# Patient Record
Sex: FEMALE | Race: WHITE | Employment: UNEMPLOYED | URBAN - NONMETROPOLITAN AREA
[De-identification: names, ages, dates, MRNs, and addresses within clinical notes are randomized per-mention and may not be internally consistent; named-entity substitution may affect disease eponyms.]

---

## 2022-04-13 RX ORDER — BUDESONIDE AND FORMOTEROL FUMARATE DIHYDRATE 80; 4.5 UG/1; UG/1
2 AEROSOL RESPIRATORY (INHALATION) DAILY
COMMUNITY
Start: 2021-07-08

## 2022-04-13 RX ORDER — ALBUTEROL SULFATE 2.5 MG/3ML
2.5 SOLUTION RESPIRATORY (INHALATION) EVERY 4 HOURS PRN
COMMUNITY
Start: 2021-03-30

## 2022-04-13 RX ORDER — BUTALBITAL, ACETAMINOPHEN AND CAFFEINE 50; 325; 40 MG/1; MG/1; MG/1
1 TABLET ORAL EVERY 4 HOURS PRN
COMMUNITY
Start: 2022-04-12

## 2022-04-13 RX ORDER — HYDROXYZINE HYDROCHLORIDE 25 MG/1
25 TABLET, FILM COATED ORAL EVERY 6 HOURS PRN
COMMUNITY
Start: 2020-11-04 | End: 2022-06-24

## 2022-04-13 RX ORDER — NAPROXEN 500 MG/1
500 TABLET ORAL 2 TIMES DAILY PRN
COMMUNITY
Start: 2021-10-27

## 2022-04-13 RX ORDER — KETOROLAC TROMETHAMINE 10 MG/1
10 TABLET, FILM COATED ORAL EVERY 6 HOURS PRN
COMMUNITY
Start: 2022-03-31 | End: 2022-06-24

## 2022-04-13 RX ORDER — DICYCLOMINE HCL 20 MG
20 TABLET ORAL EVERY 6 HOURS
COMMUNITY
Start: 2020-11-12

## 2022-04-13 RX ORDER — SUMATRIPTAN 50 MG/1
TABLET, FILM COATED ORAL
COMMUNITY
Start: 2021-07-28

## 2022-04-13 RX ORDER — LOSARTAN POTASSIUM 50 MG/1
50 TABLET ORAL DAILY
COMMUNITY
Start: 2022-04-11

## 2022-04-13 RX ORDER — ALBUTEROL SULFATE 90 UG/1
2 AEROSOL, METERED RESPIRATORY (INHALATION) EVERY 4 HOURS PRN
COMMUNITY
Start: 2021-03-30

## 2022-04-13 RX ORDER — IBUPROFEN 800 MG/1
800 TABLET ORAL EVERY 6 HOURS PRN
COMMUNITY
End: 2022-06-24

## 2022-04-13 RX ORDER — LEVETIRACETAM 500 MG/1
500 TABLET ORAL 2 TIMES DAILY
COMMUNITY
Start: 2022-04-11 | End: 2022-06-24 | Stop reason: SDUPTHER

## 2022-05-04 ENCOUNTER — INITIAL CONSULT (OUTPATIENT)
Dept: NEUROLOGY | Age: 47
End: 2022-05-04
Payer: MEDICAID

## 2022-05-04 VITALS
HEIGHT: 62 IN | BODY MASS INDEX: 23.26 KG/M2 | SYSTOLIC BLOOD PRESSURE: 128 MMHG | WEIGHT: 126.4 LBS | OXYGEN SATURATION: 95 % | HEART RATE: 90 BPM | DIASTOLIC BLOOD PRESSURE: 84 MMHG

## 2022-05-04 DIAGNOSIS — R55 SYNCOPE AND COLLAPSE: Primary | ICD-10-CM

## 2022-05-04 DIAGNOSIS — R56.9 SEIZURE-LIKE ACTIVITY (HCC): ICD-10-CM

## 2022-05-04 PROCEDURE — G8427 DOCREV CUR MEDS BY ELIG CLIN: HCPCS | Performed by: PSYCHIATRY & NEUROLOGY

## 2022-05-04 PROCEDURE — 99205 OFFICE O/P NEW HI 60 MIN: CPT | Performed by: PSYCHIATRY & NEUROLOGY

## 2022-05-04 PROCEDURE — G8420 CALC BMI NORM PARAMETERS: HCPCS | Performed by: PSYCHIATRY & NEUROLOGY

## 2022-05-04 PROCEDURE — 4004F PT TOBACCO SCREEN RCVD TLK: CPT | Performed by: PSYCHIATRY & NEUROLOGY

## 2022-05-04 RX ORDER — LOVASTATIN 40 MG/1
40 TABLET ORAL NIGHTLY
COMMUNITY
End: 2022-06-24

## 2022-05-04 RX ORDER — UBIDECARENONE 75 MG
50 CAPSULE ORAL DAILY
COMMUNITY

## 2022-05-04 NOTE — PATIENT INSTRUCTIONS
1. MRI brain W/WO contrast  2. EEG  3. Tilt table test  4. Cardiac event monitor  5. You need to start vitamin B12 sublingual supplements, at least 3000 mcg daily, over the counter  No driving, swimming, operating heavy machinery or compromising heights until cleared. Report any new events. Call if any questions. She should remain off work until cleared by neurology. Call with any new symptoms or concerns. Follow up in 7 weeks.

## 2022-05-06 NOTE — PROGRESS NOTES
Chief Complaint   Patient presents with    Consultation     SEIZURE     Josh More is a 49 y.o. female who presents today for evaluation of episode of passing out with seizure like activity 6 weeks ago. She was standing when she suddenly became dizzy, blacked out, and then convulsed. She did bite her tongue. She was also incontinent of bladder. She was seen at [facility]. CT head WO contrast done 3/31/22 showed No acute intracranial hemorrhage. She was started on keppra and is tolerating the medication well. She has pass out in the past while standing in the past. She reports she can sometimes space out. She is driving, however she was asked not to by the physicians in the hospital. She has history of ovarian cancer in the past. Her sleep is poor and interrupted, she wakes up feeling tired from sleep. She does  take daytime naps. No family history of epilepsy or seizure. She does have previous history of heart palpitation. Her father did have heart attack at young age. She does not consume alcohol or illicit drugs. She  denies chest pain. No shortness of breath, no neck pain. No vision changes. No dysphagia. No fever. No rash. No weight loss. History provided by patient. Past Medical History:   Diagnosis Date    Asthma     Cerebral artery occlusion with cerebral infarction ([facility] [address]     Hypertension     Macrocytosis without anemia     Migraine     Ovarian cancer (HCC)     Seizure ([facility] [address]        There is no problem list on file for this patient.       Allergies   Allergen Reactions    Morphine Anaphylaxis    Shellfish-Derived Products     Iodine Rash       Current Outpatient Medications   Medication Sig Dispense Refill    lovastatin (MEVACOR) 40 MG tablet Take 40 mg by mouth nightly      vitamin B-12 (CYANOCOBALAMIN) 100 MCG tablet Take 50 mcg by mouth daily      albuterol (PROVENTIL) (2.5 MG/3ML) 0.083% nebulizer solution Inhale 2.5 mg into the lungs every 4 hours as needed      albuterol sulfate  (90 Base) MCG/ACT inhaler Inhale 2 puffs into the lungs every 4 hours as needed      budesonide-formoterol (SYMBICORT) 80-4.5 MCG/ACT AERO Inhale 2 puffs into the lungs daily      butalbital-acetaminophen-caffeine (FIORICET, ESGIC) -40 MG per tablet Take 1 tablet by mouth every 4 hours as needed      Cholecalciferol 50 MCG (2000 UT) TABS Take 2,000 Units by mouth daily      dicyclomine (BENTYL) 20 MG tablet Take 20 mg by mouth every 6 hours      levETIRAcetam (KEPPRA) 500 MG tablet Take 500 mg by mouth 2 times daily      losartan (COZAAR) 50 MG tablet Take 50 mg by mouth daily      hydrOXYzine (ATARAX) 25 MG tablet Take 25 mg by mouth every 6 hours as needed (Patient not taking: Reported on 5/4/2022)      ibuprofen (ADVIL;MOTRIN) 800 MG tablet Take 800 mg by mouth every 6 hours as needed (Patient not taking: Reported on 5/4/2022)      ketorolac (TORADOL) 10 MG tablet Take 10 mg by mouth every 6 hours as needed (Patient not taking: Reported on 5/4/2022)      naproxen (NAPROSYN) 500 MG tablet Take 500 mg by mouth 2 times daily as needed (Patient not taking: Reported on 5/4/2022)      SUMAtriptan (IMITREX) 50 MG tablet TAKE 1 TABLET AT ONSET OF MIGRAINE. MAY TAKE 2ND TABLET IN 2 HOURS IF NEEDED. (Patient not taking: Reported on 5/4/2022)       No current facility-administered medications for this visit.        Social History     Socioeconomic History    Marital status:      Spouse name: None    Number of children: None    Years of education: None    Highest education level: None   Occupational History    None   Tobacco Use    Smoking status: Current Every Day Smoker     Packs/day: 0.25     Types: Cigarettes    Smokeless tobacco: Never Used   Vaping Use    Vaping Use: Some days    Substances: Nicotine   Substance and Sexual Activity    Alcohol use: Not Currently    Drug use: Not Currently    Sexual activity: Yes     Partners: Male   Other Topics Concern    None Social History Narrative    None     Social Determinants of Health     Financial Resource Strain:     Difficulty of Paying Living Expenses: Not on file   Food Insecurity:     Worried About Running Out of Food in the Last Year: Not on file    Jimmy of Food in the Last Year: Not on file   Transportation Needs:     Lack of Transportation (Medical): Not on file    Lack of Transportation (Non-Medical): Not on file   Physical Activity:     Days of Exercise per Week: Not on file    Minutes of Exercise per Session: Not on file   Stress:     Feeling of Stress : Not on file   Social Connections:     Frequency of Communication with Friends and Family: Not on file    Frequency of Social Gatherings with Friends and Family: Not on file    Attends Evangelical Services: Not on file    Active Member of Clubs or Organizations: Not on file    Attends Club or Organization Meetings: Not on file    Marital Status: Not on file   Intimate Partner Violence:     Fear of Current or Ex-Partner: Not on file    Emotionally Abused: Not on file    Physically Abused: Not on file    Sexually Abused: Not on file   Housing Stability:     Unable to Pay for Housing in the Last Year: Not on file    Number of [place] in the Last Year: Not on file    Unstable Housing in the Last Year: Not on file       Family History   Problem Relation Age of Onset    Tremors Mother     Lung Cancer Mother     Colon Cancer Mother     Colon Cancer Father     Lung Cancer Father     Colon Cancer Sister     Fibromyalgia Sister     Hypertension Maternal Grandmother     Tuberculosis Paternal Grandmother     Heart Disease Paternal Grandmother     Autism Son     High Blood Pressure Brother     Migraines Brother     Colon Cancer Sister     No Known Problems Sister     No Known Problems Sister     No Known Problems Sister          I reviewed the past medical history, allergies, medications, social history and family history.    Review of Systems   All systems reviewed, and are all negative, except what is mentioned in HPI      Vitals:    05/04/22 1222   BP: 128/84   Pulse: 90   SpO2: 95%   Weight: 126 lb 6.4 oz (57.3 kg)   Height: 5' 2\" (1.575 m)       Physical Examination:  General appearance - alert, well appearing, and in no distress, oriented to person, place, and time and normal weight  Mental status- Level of Alertness: awake  Orientation: person, place, time  Memory: normal  Fund of Knowledge: normal  Attention/Concentration: normal  Language: normal, she has pressured speech. Mood is anxious. Neck - supple, no significant adenopathy, carotids upstroke normal bilaterally. There is no axillary lymphadenopathy. There is no carotid bruit. No neck lymphadenopathy . No thyroid enlargement   Neurological -   Cranial Kxfojc-PE-LID:.   Cranial nerve II: Normal   Cranial nerve III: Pupils: equal, round, reactive to light  Cranial nerves III, IV, VI: Extraocular Movements: intact   Cranial nerve V: Facial sensation: intact   Cranial nerve VII:Facial strength: intact   Cranial nerve VIII: Hearing: intact   Cranial nerve IX: Palate Elevation intact bilaterally  Cranial nerve XI: Shoulder shrug intact bilaterally  Cranial nerve XII: Tongue midline   neck supple without rigidity, there is no limitation of range of motion of the neck. DTR's are decreased distal and symmetric  Babinski sign negative  Motor exam is 5/5 in the upper and lower extremities. Normal muscle tone. There is no muscle atrophy. Sensory is intact for light touch. Coordination: finger to nose,  intact  Gait and station intact  Skin - warm, dry to touch, normal coloration, no rashes, no suspicious skin lesions  Superficial temporal artery pulses are normal.   There is no limitation of range of motion of the neck. There is no resting tremor, no pin rolling, no bradykinesia, no Hypohonia, normal blink rate.   Musculoskeletal: Has no hand arthritis, no limitation of ROM in any of the four extremities. There is no leg edema. The Heart was regular in rate and rhythm. No heart murmur  Chest Clear, with  good effort. Abdomen soft, intact bowel sounds. We reviewed the patient records from referring provider and available information in the EHR       ASSESSMENT:      Diagnosis Orders   1. Syncope and collapse     2. Seizure-like activity ([facility] [address]          This is a 50-year-old female who presents with an episode of passing out with seizure-like activity that occurred 6 weeks ago. She was standing when she suddenly became dizzy, blacked out, and then convulsed. She did bite her tongue. She was also incontinent of bladder. [facility] was [facility] at [facility]. I reviewed the CT Brain and agree with interpretation, I also reviewed the patient pertinent labs and records in the EHR and from other providers. CT head WO contrast done 3/31/22 showed No acute intracranial hemorrhage. She was started on keppra and is tolerating the medication well. She denies any previous history of seizure, however she has passed out in the past while standing. She reports she can sometimes space out. Difficult to determine exact cause of patient symptoms weather neurologic or cardiac. She will need to undergo work-up from both neurologic as well as cardiac standpoint. The patient was counseled about her symptoms and work up recommended. We will arrange for her to undergo an MRI of the brain with and without contrast to evaluate for organic causes of her symptoms as well as an EEG to screen for cortical irritability. We will also order a tilt table test to screen for orthostatic hypotension/vasovagal syncope as well as 30-day cardiac event monitor to evaluate for underlying occult arrhythmia. She was counseled on the importance of refraining from driving, operating heavy machinery, or compromising heights until cleared.   She does work as a  and was advised to remain off work until cleared by neurology. After detailed discussion with the patient we agreed on the following plan. Plan    1. MRI brain W/WO contrast  2. EEG  3. Tilt table test  4. Cardiac event monitor  5. You need to start vitamin B12 sublingual supplements, at least 3000 mcg daily, over the counter  6. No driving, swimming, operating heavy machinery or compromising heights until cleared. Report any new events. Call if any questions. 7. She should remain off work until cleared by neurology. 8. Call with any new symptoms or concerns. 9. Follow up in 7 weeks.      Total time 79 min      Farhad Mariee MD

## 2022-05-25 ENCOUNTER — HOSPITAL ENCOUNTER (OUTPATIENT)
Dept: NON INVASIVE DIAGNOSTICS | Age: 47
Discharge: HOME | End: 2022-05-25
Payer: MEDICAID

## 2022-05-25 DIAGNOSIS — R56.9 SEIZURE-LIKE ACTIVITY (HCC): ICD-10-CM

## 2022-05-25 DIAGNOSIS — R55 SYNCOPE AND COLLAPSE: ICD-10-CM

## 2022-05-25 PROCEDURE — 93270 REMOTE 30 DAY ECG REV/REPORT: CPT

## 2022-05-25 PROCEDURE — 93660 TILT TABLE EVALUATION: CPT | Performed by: INTERNAL MEDICINE

## 2022-05-25 NOTE — PROCEDURES
30 Day Cardiac Event Monitor was applied to patient. Instructions were given and skin/monitor prep and application was demonstrated. Patient was instructed to remove monitor on 06/24/2022 and mail back to [employer].

## 2022-05-26 NOTE — PROCEDURES
[address] [place] [address]                                TILT TABLE TEST    PATIENT NAME: Junito Butler                       :        1975  MED REC NO:   [id number]                           ROOM:  ACCOUNT NO:   [de-identified]                           ADMIT DATE: 2022  PROVIDER:     Alejandra Mcghee. Watson Rangel M.D.    Donna Valles:  2022    TILT TABLE TEST    INDICATION:  Syncope. TILT TABLE TEST DESCRIPTION AND FINDINGS:  Baseline blood pressure  129/76, pulse rate 76. Baseline EKG sinus rhythm. PROCEDURE DETAIL:  Under continuous EKG monitoring and intermittent  blood pressure monitoring, the patient was allowed to assume standing  position at 70-degree inclination. Three minutes into tilting; blood  pressure 115/63, pulse rate 94. Ten minutes into tilting; blood  pressure 124/68, pulse rate 100. Twenty minutes into tilting; blood  pressure 124/64, pulse rate 108. Thirty minutes into tilting; blood  pressure 120/60, pulse rate 111. After 30 minutes into tilting; the  patient was allowed to assume supine position. Three minutes in supine  position; blood pressure 127/63, pulse rate 80. SYMPTOMATOLOGY:  No symptoms throughout the tilting. HEMODYNAMICS:  Blood pressure is well maintained throughout the tilting. EKG MONITORING:  No arrhythmia or pause. Heart rate a little bit on the higher side in the rate of 90 to 110  during the tilting, otherwise no abnormality noted. CONCLUSION:  1. This is a benign tilt table study. 2.  Tilt table test is negative for vasovagal response. 3.  Tilt table test is negative for POTS (postural orthostatic  tachycardia syndrome). 4.  Tilt table test is negative for orthostatic hypotension. Bill Stephenson M.D.    D: 2022 15:47:36       T: 2022 18:04:59     [email or web address]  Job#: [id number]     Doc#: [id number]    CC:

## 2022-06-03 ENCOUNTER — HOSPITAL ENCOUNTER (OUTPATIENT)
Dept: NEUROLOGY | Age: 47
Discharge: HOME | End: 2022-06-03
Payer: MEDICAID

## 2022-06-03 ENCOUNTER — HOSPITAL ENCOUNTER (OUTPATIENT)
Dept: MRI IMAGING | Age: 47
Discharge: HOME | End: 2022-06-03
Payer: MEDICAID

## 2022-06-03 DIAGNOSIS — R56.9 SEIZURE-LIKE ACTIVITY (HCC): ICD-10-CM

## 2022-06-03 DIAGNOSIS — R55 SYNCOPE AND COLLAPSE: ICD-10-CM

## 2022-06-03 PROCEDURE — 6360000004 HC RX CONTRAST MEDICATION: Performed by: NURSE PRACTITIONER

## 2022-06-03 PROCEDURE — 95819 EEG AWAKE AND ASLEEP: CPT | Performed by: PSYCHIATRY & NEUROLOGY

## 2022-06-03 PROCEDURE — 70553 MRI BRAIN STEM W/O & W/DYE: CPT

## 2022-06-03 PROCEDURE — A9579 GAD-BASE MR CONTRAST NOS,1ML: HCPCS | Performed by: NURSE PRACTITIONER

## 2022-06-03 PROCEDURE — 95816 EEG AWAKE AND DROWSY: CPT

## 2022-06-03 RX ADMIN — GADOTERIDOL 10 ML: 279.3 INJECTION, SOLUTION INTRAVENOUS at 12:36

## 2022-06-03 NOTE — PROGRESS NOTES
every 4 hours as needed 3/30/21   Historical Provider, MD   budesonide-formoterol (SYMBICORT) 80-4.5 MCG/ACT AERO Inhale 2 puffs into the lungs daily 7/8/21   Historical Provider, MD   butalbital-acetaminophen-caffeine (FIORICET, ESGIC) -40 MG per tablet Take 1 tablet by mouth every 4 hours as needed 4/12/22   Historical Provider, MD   Cholecalciferol 50 MCG (2000 UT) TABS Take 2,000 Units by mouth daily 8/13/21   Historical Provider, MD   dicyclomine (BENTYL) 20 MG tablet Take 20 mg by mouth every 6 hours 11/12/20   Historical Provider, MD   hydrOXYzine (ATARAX) 25 MG tablet Take 25 mg by mouth every 6 hours as needed  Patient not taking: Reported on 5/4/2022 11/4/20   Historical Provider, MD   ibuprofen (ADVIL;MOTRIN) 800 MG tablet Take 800 mg by mouth every 6 hours as needed  Patient not taking: Reported on 5/4/2022    Historical Provider, MD   ketorolac (TORADOL) 10 MG tablet Take 10 mg by mouth every 6 hours as needed  Patient not taking: Reported on 5/4/2022 3/31/22   Historical Provider, MD   levETIRAcetam (KEPPRA) 500 MG tablet Take 500 mg by mouth 2 times daily 4/11/22   Historical Provider, MD   losartan (COZAAR) 50 MG tablet Take 50 mg by mouth daily 4/11/22   Historical Provider, MD   naproxen (NAPROSYN) 500 MG tablet Take 500 mg by mouth 2 times daily as needed  Patient not taking: Reported on 5/4/2022 10/27/21   Historical Provider, MD   SUMAtriptan (IMITREX) 50 MG tablet TAKE 1 TABLET AT ONSET OF MIGRAINE. MAY TAKE 2ND TABLET IN 2 HOURS IF NEEDED.   Patient not taking: Reported on 5/4/2022 7/28/21   Historical Provider, MD       Technician: Katelin Posada 6/3/2022

## 2022-06-09 NOTE — PROCEDURES
[address] [place] [address]                          ELECTROENCEPHALOGRAM REPORT    PATIENT NAME: Geronimo Abernathy                       :        1975  MED REC NO:   [id number]                           ROOM:  ACCOUNT NO:   [de-identified]                           ADMIT DATE: 2022  PROVIDER:     Fernie Hoffmann. Cody Taylor MD    DATE OF EE2022    REFERRING PROVIDER:  Ryan Guevara CNP    CLINICAL HISTORY:  A 79-year-old female presenting for evaluation for  seizure. The patient had tongue biting; hit head on the machine; had  stitching over the right parietal area, left forehead. Medications  listed are Mevacor, vitamin B12, Proventil, albuterol, Symbicort,  Fioricet, Bentyl, Atarax, ibuprofen, Toradol, Keppra, Cozaar, Naprosyn,  Imitrex. CLINICAL INTERPRETATION:  This is a routine 20-minute EEG recording  using the international 10/20 system on a [employer] workstation. Automated  spike and seizure detection algorithms were applied. The patient is  described as alert. Background rhythm activity is noted to be 7 to 8 Hz in the posterior  parietal area, symmetric, well modulated, attenuates with eye opening. Lead artifact, muscle artifacts were noted. Hyperventilation was  performed. Hyperventilation was performed for 2 minutes with fair  effort without abnormality. Lead artifact was noted. Fast beta  activity was noted during the recording suggestive of mild cortical  dysfunction such as seen with metabolic causes, medication effects or  nonspecific encephalopathies. No clinical seizures were observed. Occasional left frontal sharp waves are noted during the recording that  are of questionable significance, though they may be epileptogenic in  nature or indicate seizure tendency. Photic stimulation was performed  without abnormality.     IMPRESSION:  This is an abnormal EEG due to the presence of occasional  left frontal sharp waves that are of questionable significance, though  they may be epileptogenic in nature or indicate seizure tendency. In  addition, fast beta activity was noted during the recording suggestive  of mild cortical dysfunction such as seen with metabolic causes,  medication effects or nonspecific encephalopathies. No clinical  seizures were observed.         Klaus Valdivia MD    D: 06/09/2022 14:01:36       T: 06/09/2022 14:05:23     [email or web address]  Job#: [id number]     Doc#: [id number]    CC:

## 2022-06-10 ENCOUNTER — TELEPHONE (OUTPATIENT)
Dept: NEUROLOGY | Age: 47
End: 2022-06-10

## 2022-06-10 RX ORDER — ZONISAMIDE 50 MG/1
50 CAPSULE ORAL DAILY
Qty: 30 CAPSULE | Refills: 3 | Status: SHIPPED | OUTPATIENT
Start: 2022-06-10 | End: 2022-06-24 | Stop reason: SDUPTHER

## 2022-06-10 NOTE — TELEPHONE ENCOUNTER
----- Message from AGATA Young - CNP sent at 6/10/2022  8:24 AM EDT -----  Please let patient know EEG was abnormal showing irritability in the left frontal area. We recommend she start on zonegran 50 mg nightly, take with plenty of fluids. Script sent to pharmacy on file.    Ronnell Noe CNP

## 2022-06-24 ENCOUNTER — OFFICE VISIT (OUTPATIENT)
Dept: NEUROLOGY | Age: 47
End: 2022-06-24
Payer: MEDICAID

## 2022-06-24 VITALS
HEART RATE: 80 BPM | BODY MASS INDEX: 23 KG/M2 | HEIGHT: 62 IN | DIASTOLIC BLOOD PRESSURE: 84 MMHG | OXYGEN SATURATION: 96 % | SYSTOLIC BLOOD PRESSURE: 126 MMHG | WEIGHT: 125 LBS

## 2022-06-24 DIAGNOSIS — R55 SYNCOPE AND COLLAPSE: Primary | ICD-10-CM

## 2022-06-24 DIAGNOSIS — R56.9 SEIZURE-LIKE ACTIVITY (HCC): ICD-10-CM

## 2022-06-24 PROCEDURE — G8427 DOCREV CUR MEDS BY ELIG CLIN: HCPCS | Performed by: PSYCHIATRY & NEUROLOGY

## 2022-06-24 PROCEDURE — 4004F PT TOBACCO SCREEN RCVD TLK: CPT | Performed by: PSYCHIATRY & NEUROLOGY

## 2022-06-24 PROCEDURE — 99214 OFFICE O/P EST MOD 30 MIN: CPT | Performed by: PSYCHIATRY & NEUROLOGY

## 2022-06-24 PROCEDURE — G8420 CALC BMI NORM PARAMETERS: HCPCS | Performed by: PSYCHIATRY & NEUROLOGY

## 2022-06-24 RX ORDER — LEVETIRACETAM 500 MG/1
500 TABLET ORAL 2 TIMES DAILY
Qty: 60 TABLET | Refills: 5 | Status: SHIPPED | OUTPATIENT
Start: 2022-06-24 | End: 2022-09-26 | Stop reason: SDUPTHER

## 2022-06-24 RX ORDER — ZONISAMIDE 100 MG/1
100 CAPSULE ORAL DAILY
Qty: 30 CAPSULE | Refills: 3 | Status: SHIPPED | OUTPATIENT
Start: 2022-06-24 | End: 2022-09-26

## 2022-06-24 RX ORDER — NICOTINE 21 MG/24HR
1 PATCH, TRANSDERMAL 24 HOURS TRANSDERMAL EVERY 24 HOURS
COMMUNITY
Start: 2022-05-12

## 2022-06-24 NOTE — PATIENT INSTRUCTIONS
1. Change Zonegran to 100 mg daily, take with plenty of fluids. 2. Await Cardiac event monitor. 3. Continue with Keppra 500 mg twice a day. 4. Keppra level. 5. Take vitamin B12 sublingual supplements, at least 3000 mcg daily, over the counter  6. No driving, swimming, operating heavy machinery or compromising heights until cleared. Report any new events. Call if any questions. 7. May return to work while honoring the restriction above. 8. Call with any new symptoms or concerns. 9. Follow up in 2 weeks.

## 2022-06-24 NOTE — PROGRESS NOTES
NEUROLOGY OUT PATIENT FOLLOW UP NOTE:  6/24/202212:45 PM    Joy Regan is here for follow up for   Patient Active Problem List   Diagnosis    Syncope and collapse    Seizure-like activity ([address]       Follow up for syncope, seizure like activity. She had testing performed. Allergies   Allergen Reactions    Morphine Anaphylaxis    Shellfish-Derived Products     Iodine Rash       Current Outpatient Medications:     nicotine (NICODERM CQ) 14 MG/24HR, Place 1 patch onto the skin every 24 hours, Disp: , Rfl:     zonisamide (ZONEGRAN) 50 MG capsule, Take 1 capsule by mouth daily Take with plenty of fluids, Disp: 30 capsule, Rfl: 3    vitamin B-12 (CYANOCOBALAMIN) 100 MCG tablet, Take 50 mcg by mouth daily, Disp: , Rfl:     albuterol (PROVENTIL) (2.5 MG/3ML) 0.083% nebulizer solution, Inhale 2.5 mg into the lungs every 4 hours as needed, Disp: , Rfl:     albuterol sulfate  (90 Base) MCG/ACT inhaler, Inhale 2 puffs into the lungs every 4 hours as needed, Disp: , Rfl:     budesonide-formoterol (SYMBICORT) 80-4.5 MCG/ACT AERO, Inhale 2 puffs into the lungs daily, Disp: , Rfl:     butalbital-acetaminophen-caffeine (FIORICET, ESGIC) -40 MG per tablet, Take 1 tablet by mouth every 4 hours as needed, Disp: , Rfl:     Cholecalciferol 50 MCG (2000 UT) TABS, Take 2,000 Units by mouth daily, Disp: , Rfl:     dicyclomine (BENTYL) 20 MG tablet, Take 20 mg by mouth every 6 hours, Disp: , Rfl:     levETIRAcetam (KEPPRA) 500 MG tablet, Take 500 mg by mouth 2 times daily, Disp: , Rfl:     losartan (COZAAR) 50 MG tablet, Take 50 mg by mouth daily, Disp: , Rfl:     naproxen (NAPROSYN) 500 MG tablet, Take 500 mg by mouth 2 times daily as needed , Disp: , Rfl:     SUMAtriptan (IMITREX) 50 MG tablet, TAKE 1 TABLET AT ONSET OF MIGRAINE. MAY TAKE 2ND TABLET IN 2 HOURS IF NEEDED., Disp: , Rfl:     I reviewed the past medical history, allergies, medications, social history and family history.        PE: Vitals:    06/24/22 1235   BP: 126/84   Site: Right Upper Arm   Position: Sitting   Cuff Size: Medium Adult   Pulse: 80   SpO2: 96%   Weight: 125 lb (56.7 kg)   Height: 5' 2\" (1.575 m)     General Appearance:  alert and cooperative  Skin:  Skin color, texture, turgor normal. No rashes or lesions. Gen: NAD, Language is Intact. Skin: no rash, lesion,  moist to touch. warm  Head: no rash, no icterus  Neck: There is no carotid bruits. The Neck is supple. There is no neck lymphadenopathy. Neuro: CN 2-12 grossly intact with no focal deficits. Power 5/5 Throughout symmetric, Reflexes are +2 symmetric. Long tracts are intact. Cerebellar exam is Intact. Sensory exam is intact to light touch. Gait is intact. Musculoskeletal:  Has no hand arthritis, no limitation of ROM in any of the four extremities. Lower extremities no edema          DATA:          MRI BRAIN W WO CONTRAST    Narrative  PROCEDURE: MRI BRAIN W WO CONTRAST    CLINICAL INFORMATIONSyncope and collapse, Seizure-like activity ([facility] [address] COMPARISON: No prior study. TECHNIQUE: Multiplanar and multiple spin echo T1 and T2-weighted images were obtained through the brain before and after the administration of intravenous contrast.    FINDINGS:        The diffusion-weighted images are normal. The brain volume is normal.There are no intra-or extra-axial collections. There is no hydrocephalus, midline shift or mass effect. On the FLAIR and T2-weighted sequences, there are punctate areas of increased signal intensity in the white matter. There is no definite structural abnormality noted in the temporal lobes. . There is mild prominence of pineal gland which measures 7 x 4 mm  in size. On the gradient echo T2-weighted images, there is mineralization in the medial aspects of the basal ganglia. No other areas of susceptibility artifact are present. There is no abnormal enhancement in the brain. The major intracranial vascular flow voids are present.   The midline craniocervical junction structures are normal.  The brainstem and pituitary gland are normal.    There is slightly increased signal intensity in the right mastoid air cells consistent with inflammatory changes. There is mild enlargement of adenoids. Impression  1. No definite structural abnormality noted in the temporal lobes. 2. Punctate nonspecific areas of increased signal intensity in the white matter. No evidence of an acute  infarct. 3. Mild prominence of the pineal gland. 4. Otherwise negative MRI scan of the brain with and without intravenous contrast.  5. Mild enlargement of the adenoids. 6. Mild inflammatory changes in the right mastoid air cells. **This report has been created using voice recognition software. It may contain minor errors which are inherent in voice recognition technology. **      Final report electronically signed by DR Renuka Hess on 6/3/2022 1:03 PM    Tilt table test:  CONCLUSION:  1. This is a benign tilt table study. 2.  Tilt table test is negative for vasovagal response. 3.  Tilt table test is negative for POTS (postural orthostatic  tachycardia syndrome). 4.  Tilt table test is negative for orthostatic hypotension.           DOLLY Doshi M.D.     D: 05/25/2022 15:47:36      EEG 6/3/2022   IMPRESSION:  This is an abnormal EEG due to the presence of occasional  left frontal sharp waves that are of questionable significance, though  they may be epileptogenic in nature or indicate seizure tendency. In  addition, fast beta activity was noted during the recording suggestive  of mild cortical dysfunction such as seen with metabolic causes,  medication effects or nonspecific encephalopathies. No clinical  seizures were observed. Cosme Zuluaga MD     D: 06/09/2022 14:01:36      Assessment:     Diagnosis Orders   1. Syncope and collapse     2. Seizure-like activity ([facility] [address]          Follow up for syncope, seizure.  The patient had testing including MRI brain 6/3/2022 was unremarkable. EEG was abnormal showed occastional left frontal sharps. She is on Zonegran 100 mg daily. Event monitor is pending. Tilt table was benign 5/25/2022. Her most recent even was 4 days ago. Event monitor was taken off yesterday and is pending interpretation. Her exam is non focal. Her most recent event was on Zonegran 50 mg daily, she is also on Keppra 500 mg twice a day. she denies side effects to the zonegran. so would change to 100 mg daily. The patient was counseled about her symptoms and work up obtained, she was also counseled about medications and side effects. The patient may return to work, with the restrictions of no driving or operating heavy machinery. The patient was counseled about her symptoms and work up results. After detailed discussion with patient we agreed on the following plan.  r      Plan:  1. Change Zonegran to 100 mg daily, take with plenty of fluids. 2. Await Cardiac event monitor. 3. Continue with Keppra 500 mg twice a day. 4. Keppra level. 5. Take vitamin B12 sublingual supplements, at least 3000 mcg daily, over the counter  6. No driving, swimming, operating heavy machinery or compromising heights until cleared. Report any new events. Call if any questions. 7. May return to work while honoring the restriction above. 8. Call with any new symptoms or concerns. 9. Follow up in 2 weeks.        Total time 35 min    Oumou Pedro MD

## 2022-06-27 NOTE — PROCEDURES
[address] [place] [address]                                 EVENT MONITOR    PATIENT NAME: Jaciel Vincent                       :        1975  MED REC NO:   [id number]                           ROOM:  ACCOUNT NO:   [de-identified]                           ADMIT DATE: 2022  PROVIDER:     Elayne Gilford, M.D. CLINICAL HISTORY AND INDICATION:  This is a patient with syncope. EVENT MONITOR DESCRIPTION:  Event monitor was attached to the patient  between 2022 and 2022. EVENT MONITOR FINDINGS:  Baseline rhythm showed sinus rhythm with  episodes of sinus tachycardia that could be physiologic. Otherwise, no  significant arrhythmias noted on this event monitor.         Sagar Sparks M.D.    D: 2022 7:22:31       T: 2022 11:05:49     [email or web address]  Job#: [id number]     Doc#: [id number]    CC:

## 2022-06-30 LAB — KEPPRA: 18.5 MCG/ML (ref 12–46)

## 2022-07-25 ENCOUNTER — OFFICE VISIT (OUTPATIENT)
Dept: NEUROLOGY | Age: 47
End: 2022-07-25
Payer: MEDICAID

## 2022-07-25 VITALS
BODY MASS INDEX: 22.63 KG/M2 | SYSTOLIC BLOOD PRESSURE: 118 MMHG | HEIGHT: 62 IN | HEART RATE: 71 BPM | DIASTOLIC BLOOD PRESSURE: 68 MMHG | OXYGEN SATURATION: 97 % | WEIGHT: 123 LBS

## 2022-07-25 DIAGNOSIS — R55 SYNCOPE AND COLLAPSE: ICD-10-CM

## 2022-07-25 DIAGNOSIS — R56.9 SEIZURE-LIKE ACTIVITY (HCC): Primary | ICD-10-CM

## 2022-07-25 PROCEDURE — 99213 OFFICE O/P EST LOW 20 MIN: CPT | Performed by: NURSE PRACTITIONER

## 2022-07-25 PROCEDURE — G8420 CALC BMI NORM PARAMETERS: HCPCS | Performed by: NURSE PRACTITIONER

## 2022-07-25 PROCEDURE — 4004F PT TOBACCO SCREEN RCVD TLK: CPT | Performed by: NURSE PRACTITIONER

## 2022-07-25 PROCEDURE — G8427 DOCREV CUR MEDS BY ELIG CLIN: HCPCS | Performed by: NURSE PRACTITIONER

## 2022-07-25 NOTE — PATIENT INSTRUCTIONS
Continue with Zonegran to 100 mg daily, take with plenty of fluids. Continue with Keppra 500 mg twice a day. Take vitamin B12 sublingual supplements, at least 3000 mcg daily, over the counter  No driving, swimming, operating heavy machinery or compromising heights until cleared. Report any new events. Call if any questions. May return to work while honoring the restriction above. Call with any new symptoms or concerns.   Follow up in 2 months

## 2022-07-25 NOTE — PROGRESS NOTES
NEUROLOGY OUT PATIENT FOLLOW UP NOTE:  7/25/20229:40 AM    Ellie Henderson is here for follow up for syncope and collapse, seizure activity. ROS:  Respiratory : no cough, no shortness of breath  Cardiac: no chest pain. No palpitations.   Renal : no flank pain, no hematuria    Skin: no rash      Allergies   Allergen Reactions    Morphine Anaphylaxis    Shellfish-Derived Products     Iodine Rash       Current Outpatient Medications:     zonisamide (ZONEGRAN) 100 MG capsule, Take 1 capsule by mouth daily Take with plenty of fluids, Disp: 30 capsule, Rfl: 3    levETIRAcetam (KEPPRA) 500 MG tablet, Take 1 tablet by mouth 2 times daily, Disp: 60 tablet, Rfl: 5    vitamin B-12 (CYANOCOBALAMIN) 100 MCG tablet, Take 50 mcg by mouth daily, Disp: , Rfl:     albuterol (PROVENTIL) (2.5 MG/3ML) 0.083% nebulizer solution, Inhale 2.5 mg into the lungs every 4 hours as needed, Disp: , Rfl:     albuterol sulfate  (90 Base) MCG/ACT inhaler, Inhale 2 puffs into the lungs every 4 hours as needed, Disp: , Rfl:     butalbital-acetaminophen-caffeine (FIORICET, ESGIC) -40 MG per tablet, Take 1 tablet by mouth every 4 hours as needed, Disp: , Rfl:     Cholecalciferol 50 MCG (2000 UT) TABS, Take 2,000 Units by mouth daily, Disp: , Rfl:     dicyclomine (BENTYL) 20 MG tablet, Take 20 mg by mouth every 6 hours, Disp: , Rfl:     losartan (COZAAR) 50 MG tablet, Take 50 mg by mouth daily, Disp: , Rfl:     naproxen (NAPROSYN) 500 MG tablet, Take 500 mg by mouth 2 times daily as needed , Disp: , Rfl:     SUMAtriptan (IMITREX) 50 MG tablet, TAKE 1 TABLET AT ONSET OF MIGRAINE. MAY TAKE 2ND TABLET IN 2 HOURS IF NEEDED., Disp: , Rfl:     nicotine (NICODERM CQ) 14 MG/24HR, Place 1 patch onto the skin every 24 hours (Patient not taking: Reported on 7/25/2022), Disp: , Rfl:     budesonide-formoterol (SYMBICORT) 80-4.5 MCG/ACT AERO, Inhale 2 puffs into the lungs daily (Patient not taking: Reported on 7/25/2022), Disp: , Rfl:     I reviewed the past medical history, allergies, medications, social history and family history. PE:   Vitals:    07/25/22 0926   BP: 118/68   Site: Left Upper Arm   Position: Sitting   Cuff Size: Medium Adult   Pulse: 71   SpO2: 97%   Weight: 123 lb (55.8 kg)   Height: 5' 2\" (1.575 m)     General Appearance:  alert and cooperative  Skin:  Skin color, texture, turgor normal. No rashes or lesions. Gen: NAD, Language is Intact. Skin: no rash, lesion,  moist to touch. Warm. She has pressured speech. Head: no rash, no icterus  Neck: There is no carotid bruits. The Neck is supple. There is no neck lymphadenopathy. Neuro: CN 2-12 grossly intact with no focal deficits. Power 5/5 Throughout symmetric, Reflexes are +2 symmetric. Long tracts are intact. Cerebellar exam is Intact. Sensory exam is intact to light touch. Gait is intact. Musculoskeletal:  Has no hand arthritis, no limitation of ROM in any of the four extremities. Lower extremities no edema          DATA:      Results for orders placed or performed in visit on 06/24/22   Levetiracetam Level   Result Value Ref Range    Levetiracetam Lvl 18.5 12.0 - 46.0 mcg/mL        Results for orders placed during the hospital encounter of 06/03/22    MRI BRAIN W WO CONTRAST    Narrative  PROCEDURE: MRI BRAIN W WO CONTRAST    CLINICAL INFORMATIONSyncope and collapse, Seizure-like activity ([facility] [address] COMPARISON: No prior study. TECHNIQUE: Multiplanar and multiple spin echo T1 and T2-weighted images were obtained through the brain before and after the administration of intravenous contrast.    FINDINGS:        The diffusion-weighted images are normal. The brain volume is normal.There are no intra-or extra-axial collections. There is no hydrocephalus, midline shift or mass effect. On the FLAIR and T2-weighted sequences, there are punctate areas of increased signal intensity in the white matter. There is no definite structural abnormality noted in the temporal lobes. . There is mild prominence of pineal gland which measures 7 x 4 mm  in size. On the gradient echo T2-weighted images, there is mineralization in the medial aspects of the basal ganglia. No other areas of susceptibility artifact are present. There is no abnormal enhancement in the brain. The major intracranial vascular flow voids are present. The midline craniocervical junction structures are normal.  The brainstem and pituitary gland are normal.    There is slightly increased signal intensity in the right mastoid air cells consistent with inflammatory changes. There is mild enlargement of adenoids. Impression  1. No definite structural abnormality noted in the temporal lobes. 2. Punctate nonspecific areas of increased signal intensity in the white matter. No evidence of an acute  infarct. 3. Mild prominence of the pineal gland. 4. Otherwise negative MRI scan of the brain with and without intravenous contrast.  5. Mild enlargement of the adenoids. 6. Mild inflammatory changes in the right mastoid air cells. **This report has been created using voice recognition software. It may contain minor errors which are inherent in voice recognition technology. **      Final report electronically signed by DR Amadeo Hughes on 6/3/2022 1:03 PM    No results found for this or any previous visit. No results found for this or any previous visit. Cardiac event monitor done 5/25/22:  EVENT MONITOR FINDINGS:  Baseline rhythm showed sinus rhythm with  episodes of sinus tachycardia that could be physiologic. Otherwise, no  significant arrhythmias noted on this event monitor. Kana Lim M.D. Tilt table test:  CONCLUSION:  1. This is a benign tilt table study. 2.  Tilt table test is negative for vasovagal response. 3.  Tilt table test is negative for POTS (postural orthostatic  tachycardia syndrome). 4.  Tilt table test is negative for orthostatic hypotension. Qasim Eli M.D.     D: 05/25/2022 15:47:36       EEG 6/3/2022   IMPRESSION:  This is an abnormal EEG due to the presence of occasional  left frontal sharp waves that are of questionable significance, though  they may be epileptogenic in nature or indicate seizure tendency. In  addition, fast beta activity was noted during the recording suggestive  of mild cortical dysfunction such as seen with metabolic causes,  medication effects or nonspecific encephalopathies. No clinical  seizures were observed. Luis Enrique MD     D: 06/09/2022 14:01:36      Assessment:     Diagnosis Orders   1. Seizure-like activity ([facility] [address]        2. Syncope and collapse             Follow up for syncope, seizure. She denies any new events or spells. The patient had testing including MRI brain 6/3/2022 was unremarkable. EEG was abnormal showed occastional left frontal sharps. She is on Zonegran 100 mg daily, in addition to the Keppra 500 mg two times a day. She is tolerating the medication well, no side effects noted. Keppra level done 6/24/22=18.5. Event monitor done 5/25/22 showed no significant arrhythmias noted on this event monitor. Tilt table was benign 5/25/2022. 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 After detailed discussion with patient we agreed on the following plan. Plan:  Continue with Zonegran to 100 mg daily, take with plenty of fluids. Continue with Keppra 500 mg twice a day. Take vitamin B12 sublingual supplements, at least 3000 mcg daily, over the counter  No driving, swimming, operating heavy machinery or compromising heights until cleared. Report any new events. Call if any questions. May return to work while honoring the restriction above. Call with any new symptoms or concerns.   Follow up in 2 months    Total time 24 min    Charo Chery, AGATA - CNP

## 2022-09-26 ENCOUNTER — OFFICE VISIT (OUTPATIENT)
Dept: NEUROLOGY | Age: 47
End: 2022-09-26
Payer: MEDICAID

## 2022-09-26 VITALS
HEART RATE: 76 BPM | DIASTOLIC BLOOD PRESSURE: 82 MMHG | OXYGEN SATURATION: 99 % | BODY MASS INDEX: 21.35 KG/M2 | SYSTOLIC BLOOD PRESSURE: 132 MMHG | WEIGHT: 116 LBS | HEIGHT: 62 IN

## 2022-09-26 DIAGNOSIS — R56.9 SEIZURE-LIKE ACTIVITY (HCC): ICD-10-CM

## 2022-09-26 DIAGNOSIS — R55 SYNCOPE AND COLLAPSE: Primary | ICD-10-CM

## 2022-09-26 PROCEDURE — 4004F PT TOBACCO SCREEN RCVD TLK: CPT | Performed by: PSYCHIATRY & NEUROLOGY

## 2022-09-26 PROCEDURE — G8420 CALC BMI NORM PARAMETERS: HCPCS | Performed by: PSYCHIATRY & NEUROLOGY

## 2022-09-26 PROCEDURE — G8427 DOCREV CUR MEDS BY ELIG CLIN: HCPCS | Performed by: PSYCHIATRY & NEUROLOGY

## 2022-09-26 PROCEDURE — 99214 OFFICE O/P EST MOD 30 MIN: CPT | Performed by: PSYCHIATRY & NEUROLOGY

## 2022-09-26 RX ORDER — LEVETIRACETAM 500 MG/1
500 TABLET ORAL 2 TIMES DAILY
Qty: 60 TABLET | Refills: 5 | Status: SHIPPED | OUTPATIENT
Start: 2022-09-26

## 2022-09-26 RX ORDER — ZONISAMIDE 50 MG/1
50 CAPSULE ORAL DAILY
Qty: 30 CAPSULE | Refills: 5 | Status: SHIPPED | OUTPATIENT
Start: 2022-09-26

## 2022-09-26 NOTE — PATIENT INSTRUCTIONS
Change Zonegran to 50 mg daily, take with plenty of fluids. Continue with Keppra 500 mg twice a day. No driving, swimming, operating heavy machinery or compromising heights until cleared. Report any new events. Call if any questions. May return to work while honoring the restriction above. Call with any new symptoms or concerns.   Follow up in 3 months

## 2022-09-26 NOTE — PROGRESS NOTES
NEUROLOGY OUT PATIENT FOLLOW UP NOTE:  9/26/20223:10 PM    Eriberto Brunson is here for follow up for syncope and collapse, seizure activity. Allergies   Allergen Reactions    Morphine Anaphylaxis    Shellfish-Derived Products     Iodine Rash       Current Outpatient Medications:     nicotine (NICODERM CQ) 14 MG/24HR, Place 1 patch onto the skin every 24 hours, Disp: , Rfl:     zonisamide (ZONEGRAN) 100 MG capsule, Take 1 capsule by mouth daily Take with plenty of fluids, Disp: 30 capsule, Rfl: 3    levETIRAcetam (KEPPRA) 500 MG tablet, Take 1 tablet by mouth 2 times daily, Disp: 60 tablet, Rfl: 5    albuterol (PROVENTIL) (2.5 MG/3ML) 0.083% nebulizer solution, Inhale 2.5 mg into the lungs every 4 hours as needed, Disp: , Rfl:     albuterol sulfate  (90 Base) MCG/ACT inhaler, Inhale 2 puffs into the lungs every 4 hours as needed, Disp: , Rfl:     budesonide-formoterol (SYMBICORT) 80-4.5 MCG/ACT AERO, Inhale 2 puffs into the lungs daily, Disp: , Rfl:     butalbital-acetaminophen-caffeine (FIORICET, ESGIC) -40 MG per tablet, Take 1 tablet by mouth every 4 hours as needed, Disp: , Rfl:     Cholecalciferol 50 MCG (2000 UT) TABS, Take 2,000 Units by mouth daily, Disp: , Rfl:     dicyclomine (BENTYL) 20 MG tablet, Take 20 mg by mouth every 6 hours, Disp: , Rfl:     losartan (COZAAR) 50 MG tablet, Take 50 mg by mouth daily, Disp: , Rfl:     naproxen (NAPROSYN) 500 MG tablet, Take 500 mg by mouth 2 times daily as needed , Disp: , Rfl:     SUMAtriptan (IMITREX) 50 MG tablet, TAKE 1 TABLET AT ONSET OF MIGRAINE. MAY TAKE 2ND TABLET IN 2 HOURS IF NEEDED., Disp: , Rfl:     vitamin B-12 (CYANOCOBALAMIN) 100 MCG tablet, Take 50 mcg by mouth daily (Patient not taking: Reported on 9/26/2022), Disp: , Rfl:     I reviewed the past medical history, allergies, medications, social history and family history.        PE:   Vitals:    09/26/22 1449   BP: 132/82   Site: Left Upper Arm   Position: Sitting   Cuff Size: Medium Adult   Pulse: 76   SpO2: 99%   Weight: 116 lb (52.6 kg)   Height: 5' 2\" (1.575 m)     General Appearance:  alert and cooperative  Skin:  Skin color, texture, turgor normal. No rashes or lesions. Gen: NAD, Language is Intact. Skin: no rash, lesion,  moist to touch. Warm. She has pressured speech. Head: no rash, no icterus  Neck: There is no carotid bruits. The Neck is supple. There is no neck lymphadenopathy. Neuro: CN 2-12 grossly intact with no focal deficits. Power 5/5 Throughout symmetric, Reflexes are +2 symmetric. Long tracts are intact. Cerebellar exam is Intact. Sensory exam is intact to light touch. Gait is intact. Musculoskeletal:  Has no hand arthritis, no limitation of ROM in any of the four extremities. Lower extremities no edema          DATA:      Results for orders placed or performed in visit on 06/24/22   Levetiracetam Level   Result Value Ref Range    Levetiracetam Lvl 18.5 12.0 - 46.0 mcg/mL        Results for orders placed during the hospital encounter of 06/03/22    MRI BRAIN W WO CONTRAST    Narrative  PROCEDURE: MRI BRAIN W WO CONTRAST    CLINICAL INFORMATIONSyncope and collapse, Seizure-like activity ([facility] [address] COMPARISON: No prior study. TECHNIQUE: Multiplanar and multiple spin echo T1 and T2-weighted images were obtained through the brain before and after the administration of intravenous contrast.    FINDINGS:        The diffusion-weighted images are normal. The brain volume is normal.There are no intra-or extra-axial collections. There is no hydrocephalus, midline shift or mass effect. On the FLAIR and T2-weighted sequences, there are punctate areas of increased signal intensity in the white matter. There is no definite structural abnormality noted in the temporal lobes. . There is mild prominence of pineal gland which measures 7 x 4 mm  in size. On the gradient echo T2-weighted images, there is mineralization in the medial aspects of the basal ganglia.  No other areas of susceptibility artifact are present. There is no abnormal enhancement in the brain. The major intracranial vascular flow voids are present. The midline craniocervical junction structures are normal.  The brainstem and pituitary gland are normal.    There is slightly increased signal intensity in the right mastoid air cells consistent with inflammatory changes. There is mild enlargement of adenoids. Impression  1. No definite structural abnormality noted in the temporal lobes. 2. Punctate nonspecific areas of increased signal intensity in the white matter. No evidence of an acute  infarct. 3. Mild prominence of the pineal gland. 4. Otherwise negative MRI scan of the brain with and without intravenous contrast.  5. Mild enlargement of the adenoids. 6. Mild inflammatory changes in the right mastoid air cells. **This report has been created using voice recognition software. It may contain minor errors which are inherent in voice recognition technology. **      Final report electronically signed by DR Kathi Moreno on 6/3/2022 1:03 PM    No results found for this or any previous visit. No results found for this or any previous visit. Cardiac event monitor done 5/25/22:  EVENT MONITOR FINDINGS:  Baseline rhythm showed sinus rhythm with  episodes of sinus tachycardia that could be physiologic. Otherwise, no  significant arrhythmias noted on this event monitor. Cristal Carrion M.D. Tilt table test:  CONCLUSION:  1. This is a benign tilt table study. 2.  Tilt table test is negative for vasovagal response. 3.  Tilt table test is negative for POTS (postural orthostatic  tachycardia syndrome). 4.  Tilt table test is negative for orthostatic hypotension. Faustino Benitez.  Soniya Amaya M.D.     D: 05/25/2022 15:47:36       EEG 6/3/2022   IMPRESSION:  This is an abnormal EEG due to the presence of occasional  left frontal sharp waves that are of questionable significance, though  they may be epileptogenic in nature or indicate seizure tendency. In  addition, fast beta activity was noted during the recording suggestive  of mild cortical dysfunction such as seen with metabolic causes,  medication effects or nonspecific encephalopathies. No clinical  seizures were observed. Roxana Byrnes MD     D: 06/09/2022 14:01:36      Assessment:     Diagnosis Orders   1. Syncope and collapse        2. Seizure-like activity ([facility] [address]               Follow up for syncope, seizure. She denies any new events or spells. she reports doing well since last evaluation. No spells, she feels sweaty when she takes the Zonegran at night. She feels the keppra agrees with her, no side effects. Her exam is non focal. She is off the B12 supplements. I reviewed the  patient pertinent labs and records in the EHR and from other providers. Her last spell was two months ago. 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 The patient had testing including MRI brain 6/3/2022 was unremarkable. EEG was abnormal showed occastional left frontal sharps. She is on Zonegran 100 mg daily, in addition to the Keppra 500 mg two times a day. She is tolerating the medication well, no side effects noted. After detailed discussion with patient we agreed on the following plan. Plan:  Change Zonegran to 50 mg daily, take with plenty of fluids. Continue with Keppra 500 mg twice a day. No driving, swimming, operating heavy machinery or compromising heights until cleared. Report any new events. Call if any questions. May return to work while honoring the restriction above. Call with any new symptoms or concerns.   Follow up in 3 months    Total time 32 min    Jameel Farfan MD

## 2024-02-09 DIAGNOSIS — R55 SYNCOPE AND COLLAPSE: ICD-10-CM

## 2024-02-09 DIAGNOSIS — R56.9 SEIZURE-LIKE ACTIVITY (HCC): ICD-10-CM

## 2024-02-09 RX ORDER — LEVETIRACETAM 500 MG/1
500 TABLET ORAL 2 TIMES DAILY
Qty: 60 TABLET | Refills: 1 | Status: SHIPPED | OUTPATIENT
Start: 2024-02-09

## 2024-02-09 RX ORDER — ZONISAMIDE 50 MG/1
50 CAPSULE ORAL DAILY
Qty: 30 CAPSULE | Refills: 1 | Status: SHIPPED | OUTPATIENT
Start: 2024-02-09

## 2024-02-09 NOTE — TELEPHONE ENCOUNTER
Advised patient of provider response. Verbalized understanding. Patient scheduled for 5/03/2024 with Dr. Mata.

## 2024-02-09 NOTE — TELEPHONE ENCOUNTER
Patient has not been seen since 9/26/2022. Has been getting medications filled through pcp- . however was recently discharged from their office due to no shows. Patient is requesting a refill of her Zonegran and Keppra. Patient will be out of Keppra soon. No follow up appointment with us on file.   Please advise.

## 2024-04-19 DIAGNOSIS — R56.9 SEIZURE-LIKE ACTIVITY (HCC): ICD-10-CM

## 2024-04-19 DIAGNOSIS — R55 SYNCOPE AND COLLAPSE: ICD-10-CM

## 2024-04-19 RX ORDER — ZONISAMIDE 50 MG/1
CAPSULE ORAL DAILY
Qty: 30 CAPSULE | Refills: 1 | Status: SHIPPED | OUTPATIENT
Start: 2024-04-19

## 2024-04-19 NOTE — TELEPHONE ENCOUNTER
Hailee Crawford called requesting a refill on the following medications:  Requested Prescriptions     Pending Prescriptions Disp Refills    zonisamide (ZONEGRAN) 50 MG capsule [Pharmacy Med Name: ZONISAMIDE 50 MG CAPSULE] 30 capsule 1     Sig: TAKE 1 CAPSULE BY MOUTH EVERY DAY       Date of last visit: 9/26/2022-Dr. Mata  Date of next visit (if applicable):5/3/2024- Dr. Mata  Date of last refill: 2/9/24  Pharmacy Name: Anuja Pitts LPN